# Patient Record
Sex: FEMALE | Race: WHITE | NOT HISPANIC OR LATINO | Employment: UNEMPLOYED | URBAN - METROPOLITAN AREA
[De-identification: names, ages, dates, MRNs, and addresses within clinical notes are randomized per-mention and may not be internally consistent; named-entity substitution may affect disease eponyms.]

---
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
## 2023-02-07 ENCOUNTER — OFFICE VISIT (OUTPATIENT)
Dept: FAMILY MEDICINE CLINIC | Facility: CLINIC | Age: 18
End: 2023-02-07

## 2023-02-07 VITALS
HEIGHT: 64 IN | BODY MASS INDEX: 24.48 KG/M2 | WEIGHT: 143.4 LBS | HEART RATE: 100 BPM | DIASTOLIC BLOOD PRESSURE: 72 MMHG | SYSTOLIC BLOOD PRESSURE: 118 MMHG | OXYGEN SATURATION: 98 %

## 2023-02-07 DIAGNOSIS — Z13.220 SCREENING FOR CHOLESTEROL LEVEL: ICD-10-CM

## 2023-02-07 DIAGNOSIS — F41.9 ANXIETY: ICD-10-CM

## 2023-02-07 DIAGNOSIS — F32.1 MODERATE MAJOR DEPRESSION (HCC): ICD-10-CM

## 2023-02-07 DIAGNOSIS — Z00.00 ENCOUNTER FOR SCREENING AND PREVENTATIVE CARE: Primary | ICD-10-CM

## 2023-02-07 DIAGNOSIS — Z13.1 SCREENING FOR DIABETES MELLITUS: ICD-10-CM

## 2023-02-07 DIAGNOSIS — Z13.29 SCREENING FOR THYROID DISORDER: ICD-10-CM

## 2023-02-07 DIAGNOSIS — F10.10 ALCOHOL ABUSE: ICD-10-CM

## 2023-02-07 DIAGNOSIS — Z13.0 SCREENING FOR DEFICIENCY ANEMIA: ICD-10-CM

## 2023-02-07 NOTE — PROGRESS NOTES
New Patient Outpatient Note    HPI:     Deepali Camacho, 21 y o  female  presents today as a new patient and to establish care  She is interested in discussing further monitoring and treatment of her anxiety and depression  She is staying home from her second semester of college due to significant increases in anxiety and depression  She first noticed it during the mid to late semester  Many other individuals chalked it up to being away from home/ home sickness  She then started to self medicate with alcohol  It was a vicious cycle of increased stress due to demands of classes and inability to keep up due to hang overs resulting in more drinking  By the end of the semester and returning home her parents saw that there was a real issue and started the process of holding her home this semester to obtain help  She denies any sexual or mental abuse  She has had passive thoughts of hurting herself but this only happened once, and it was fleeting without a plan  She is currently on lexapro 20mg daily  She has been off of the medication for two days, but prefers to stay on current medication instead of changing it  She also had buspar on medication list but she stopped the medication due to its ineffectiveness  They require paperwork filled out to help patient maintain position in school and scholarship  The patient and her parent are interested in the referrals for psychiatry and psychology          Family   UTD in chart      Past Medical History:   Diagnosis Date   • Annual physical exam 7/12/2022   • Anxiety         Past Surgical History:   Procedure Laterality Date   • WISDOM TOOTH EXTRACTION            Current Outpatient Medications:   •  escitalopram (LEXAPRO) 20 mg tablet, TAKE 1 TABLET BY MOUTH EVERY DAY, Disp: 90 tablet, Rfl: 2     SOCIAL:   Rent/Own: Own, parrents house  Currently living with: living with parents while not in school  Stable food: Yes  Safe At Home: Yes  Hobbies: friends, working out, training, dog  Profession/ employment: [employer], [address] [employer], athletic training  Restriction to medical procedures:  None    SEXUAL HISTORY:   Did not review with patient in room  I did ask if she had any trauma or rape while in school  She denied this occurring  Psychological History  Psychiatric history: anxiety, ADD  History of inpatient: None  Current Therapy/ Provider: none, primary care took care of it  Current Medications: Lexapro 20mg, off for two days    Substance History  Smoking: none  Alcohol Use: none  Substance use:  None      ROS:     Review of Systems   Constitutional: Positive for fatigue  Negative for chills, fever and unexpected weight change  Night sweats   HENT: Negative for congestion, ear discharge, ear pain, postnasal drip, rhinorrhea, sinus pressure, sinus pain and sore throat  Eyes: Negative for pain, discharge, itching and visual disturbance  Respiratory: Negative for cough, chest tightness and shortness of breath  Cardiovascular: Positive for palpitations (when anxious)  Negative for chest pain  Gastrointestinal: Positive for nausea (anxiety, when she cannot eat)  Negative for abdominal pain, constipation, diarrhea and vomiting  Endocrine: Positive for polyphagia (anxious)  Negative for polydipsia and polyuria  Genitourinary: Negative for difficulty urinating, dysuria and frequency  Skin: Negative for rash and wound  Neurological: Positive for dizziness (w/ palpitations) and headaches (intermittent )  Negative for light-headedness  Psychiatric/Behavioral: Positive for decreased concentration  Negative for self-injury and suicidal ideas  The patient is nervous/anxious  OBJECTIVE  Vitals:    02/07/23 1344   BP: 118/72   Pulse: 100   SpO2: 98%        Physical Exam  Constitutional:       General: She is in acute distress (mild anxiety)  Appearance: Normal appearance  She is not ill-appearing, toxic-appearing or diaphoretic     HENT: Head: Normocephalic and atraumatic  Right Ear: Tympanic membrane, ear canal and external ear normal  There is no impacted cerumen  Left Ear: Tympanic membrane, ear canal and external ear normal  There is no impacted cerumen  Nose: Nose normal  No congestion or rhinorrhea  Mouth/Throat:      Mouth: Mucous membranes are moist       Pharynx: Oropharynx is clear  No oropharyngeal exudate or posterior oropharyngeal erythema  Eyes:      General:         Right eye: No discharge  Left eye: No discharge  Extraocular Movements: Extraocular movements intact  Pupils: Pupils are equal, round, and reactive to light  Cardiovascular:      Rate and Rhythm: Normal rate and regular rhythm  Pulses: Normal pulses  Heart sounds: Normal heart sounds  No murmur heard  No friction rub  No gallop  Pulmonary:      Effort: Pulmonary effort is normal  No respiratory distress  Breath sounds: Normal breath sounds  No stridor  No wheezing, rhonchi or rales  Abdominal:      General: Bowel sounds are normal  There is no distension  Palpations: Abdomen is soft  Tenderness: There is no abdominal tenderness  Musculoskeletal:      Cervical back: No tenderness  Lymphadenopathy:      Cervical: No cervical adenopathy  Skin:     General: Skin is warm  Capillary Refill: Capillary refill takes less than 2 seconds  Neurological:      Mental Status: She is alert  Sensory: No sensory deficit ( light touch present in all 4 extremities)  Motor: No weakness (5/5 in all 5 extremities)  Deep Tendon Reflexes: Reflexes normal ( 2/4, intact, C5, C6, L4, S1)  ASSESSMENT AND PLAN   Kavitha was seen today for establish care      Diagnoses and all orders for this visit:    Encounter for screening and preventative care  Screening for deficiency anemia  Screening for thyroid disorder  Screening for diabetes mellitus  Screening for cholesterol level  Establishing care   Patient to be evaluated for anemia, thyroid dysfunction, liver/kidney dysfunction, electrolyte abnormalities, or anemia  Will also have baseline labs for any changes to medications and monitoring    -     Lipid panel; Future  -     Comprehensive metabolic panel; Future  -     CBC and differential; Future  -     TSH, 3rd generation with Free T4 reflex; Future    Anxiety  Moderate major depression ([facility] [address] )  Alcohol abuse  Patient presents with exacerbation of anxiety and likely underlying depression  Mother is not convinced that this is purely depression/ anxiety and would like further diagnostic clarity  Patient has history/ diagnosis of ADD according to chart documentation, this may make it hard for determining the proper diagnosis  She was negative for the majority of manic symptoms  The patient and parent are requesting that referrals for psychology and psychiatry is placed  I reviewed other options such as SSRIs including-zoloft, celexa, paxil, prozac, but patient declined changing medications  I offered referral to partial program, patient and parents also declined at this time  Will obtain labs to review organic causes such as diabetes, electrolyte changes, or thyroid dysfunction  Patient or parent to call and follow up with insurance for local options/ providers       -     Ambulatory Referral to Psychology; Future  -     Ambulatory Referral to Psychiatry; Future  -     Comprehensive metabolic panel; Future  -     TSH, 3rd generation with Free T4 reflex;  Future         Reno Blair DO  [facility]  2/7/2023 4:48 PM

## 2023-02-09 ENCOUNTER — TELEPHONE (OUTPATIENT)
Dept: PSYCHIATRY | Facility: CLINIC | Age: 18
End: 2023-02-09

## 2023-02-09 NOTE — TELEPHONE ENCOUNTER
Patient has been added to the [facility]ication Management wait list  Confirmed insurance, needs of service, and location preferences

## 2023-02-16 LAB
ALBUMIN SERPL-MCNC: 4.4 G/DL (ref 3.6–5.1)
ALBUMIN/GLOB SERPL: 1.5 (CALC) (ref 1–2.5)
ALP SERPL-CCNC: 87 U/L (ref 36–128)
ALT SERPL-CCNC: 160 U/L (ref 5–32)
AST SERPL-CCNC: 109 U/L (ref 12–32)
BASOPHILS # BLD AUTO: 92 CELLS/UL (ref 0–200)
BASOPHILS NFR BLD AUTO: 1.4 %
BILIRUB SERPL-MCNC: 0.6 MG/DL (ref 0.2–1.1)
BUN SERPL-MCNC: 16 MG/DL (ref 7–20)
BUN/CREAT SERPL: ABNORMAL (CALC) (ref 6–22)
CALCIUM SERPL-MCNC: 9.8 MG/DL (ref 8.9–10.4)
CHLORIDE SERPL-SCNC: 103 MMOL/L (ref 98–110)
CHOLEST SERPL-MCNC: 200 MG/DL
CHOLEST/HDLC SERPL: 2.5 (CALC)
CO2 SERPL-SCNC: 25 MMOL/L (ref 20–32)
CREAT SERPL-MCNC: 0.74 MG/DL (ref 0.5–0.96)
EOSINOPHIL # BLD AUTO: 112 CELLS/UL (ref 15–500)
EOSINOPHIL NFR BLD AUTO: 1.7 %
ERYTHROCYTE [DISTWIDTH] IN BLOOD BY AUTOMATED COUNT: 14.9 % (ref 11–15)
GFR/BSA.PRED SERPLBLD CYS-BASED-ARV: 119 ML/MIN/1.73M2
GLOBULIN SER CALC-MCNC: 3 G/DL (CALC) (ref 2–3.8)
GLUCOSE SERPL-MCNC: 82 MG/DL (ref 65–99)
HCT VFR BLD AUTO: 38.5 % (ref 35–45)
HDLC SERPL-MCNC: 79 MG/DL
HGB BLD-MCNC: 12.9 G/DL (ref 11.7–15.5)
LDLC SERPL CALC-MCNC: 103 MG/DL (CALC)
LYMPHOCYTES # BLD AUTO: 1973 CELLS/UL (ref 850–3900)
LYMPHOCYTES NFR BLD AUTO: 29.9 %
MCH RBC QN AUTO: 28.6 PG (ref 27–33)
MCHC RBC AUTO-ENTMCNC: 33.5 G/DL (ref 32–36)
MCV RBC AUTO: 85.4 FL (ref 80–100)
MONOCYTES # BLD AUTO: 686 CELLS/UL (ref 200–950)
MONOCYTES NFR BLD AUTO: 10.4 %
NEUTROPHILS # BLD AUTO: 3736 CELLS/UL (ref 1500–7800)
NEUTROPHILS NFR BLD AUTO: 56.6 %
NONHDLC SERPL-MCNC: 121 MG/DL (CALC)
PLATELET # BLD AUTO: 389 THOUSAND/UL (ref 140–400)
PMV BLD REES-ECKER: 10.1 FL (ref 7.5–12.5)
POTASSIUM SERPL-SCNC: 4.2 MMOL/L (ref 3.8–5.1)
PROT SERPL-MCNC: 7.4 G/DL (ref 6.3–8.2)
RBC # BLD AUTO: 4.51 MILLION/UL (ref 3.8–5.1)
SODIUM SERPL-SCNC: 138 MMOL/L (ref 135–146)
TRIGL SERPL-MCNC: 86 MG/DL
TSH SERPL-ACNC: 4.29 MIU/L
WBC # BLD AUTO: 6.6 THOUSAND/UL (ref 3.8–10.8)

## 2023-02-24 ENCOUNTER — TELEPHONE (OUTPATIENT)
Dept: FAMILY MEDICINE CLINIC | Facility: CLINIC | Age: 18
End: 2023-02-24

## 2023-02-27 ENCOUNTER — TELEPHONE (OUTPATIENT)
Dept: FAMILY MEDICINE CLINIC | Facility: CLINIC | Age: 18
End: 2023-02-27

## 2023-02-27 ENCOUNTER — TELEPHONE (OUTPATIENT)
Dept: PSYCHIATRY | Facility: CLINIC | Age: 18
End: 2023-02-27

## 2023-02-27 NOTE — TELEPHONE ENCOUNTER
Contacted patient off of [facility]ication [facility]  to verify needs of services in attempts to offer patient an appointment at [facility]   Unable to LVM for patient to contact intake dept  in regards to scheduling appointment due to vm not being set up

## 2023-02-27 NOTE — TELEPHONE ENCOUNTER
Attempted to call patient earlier today  Went straight to [email or web address]  Therefore will attempt later this week to contact       Sekou Laboy DO  [facility]  2/27/2023 4:08 PM

## 2023-02-27 NOTE — TELEPHONE ENCOUNTER
I attempted the patient but the voicemail is full and I was unable to contact the patient  I will attempt on Wednesday again to review information

## 2023-03-08 ENCOUNTER — TELEPHONE (OUTPATIENT)
Dept: FAMILY MEDICINE CLINIC | Facility: CLINIC | Age: 18
End: 2023-03-08

## 2023-03-08 NOTE — TELEPHONE ENCOUNTER
I have attempted to contact the patient again without success  It went straight to voicemail  I will have  send a letter stating that she should call and update her phone number or set up mailbox to receive results  If interested she can make an appointment to review if necessary       Raza Jang DO  [facility]  3/8/2023 8:29 AM

## 2023-03-09 ENCOUNTER — TELEPHONE (OUTPATIENT)
Dept: FAMILY MEDICINE CLINIC | Facility: CLINIC | Age: 18
End: 2023-03-09

## 2023-03-09 DIAGNOSIS — R74.01 ELEVATED ALT MEASUREMENT: ICD-10-CM

## 2023-03-09 DIAGNOSIS — R74.01 ELEVATED AST (SGOT): Primary | ICD-10-CM

## 2023-03-09 DIAGNOSIS — E78.9 ELEVATED SERUM CHOLESTEROL: ICD-10-CM

## 2023-03-09 NOTE — TELEPHONE ENCOUNTER
Kavitha's mom called regarding the lab results since they hadn't heard anything  I told her Kavitha's number was attempted several times and a letter would be coming in the mail  She asked for a call back at her number [phone]  If she misses the call you can leave a detailed voicemail  Her mom is listed as being able to receive health care information

## 2023-03-13 NOTE — TELEPHONE ENCOUNTER
Called and spoke with mother  The patient is doing better  They have been watching her closely  They were able to get into Dr Ebenezer Mckinney and follow up with psych  She is also in therapy  Overall she is eating healthy and attempting to get back on track  She has good support with mother and father at home

## 2023-09-26 ENCOUNTER — OFFICE VISIT (OUTPATIENT)
Dept: FAMILY MEDICINE CLINIC | Facility: CLINIC | Age: 18
End: 2023-09-26
Payer: COMMERCIAL

## 2023-09-26 VITALS
WEIGHT: 119 LBS | SYSTOLIC BLOOD PRESSURE: 102 MMHG | RESPIRATION RATE: 16 BRPM | HEART RATE: 85 BPM | HEIGHT: 65 IN | OXYGEN SATURATION: 100 % | BODY MASS INDEX: 19.83 KG/M2 | DIASTOLIC BLOOD PRESSURE: 62 MMHG

## 2023-09-26 DIAGNOSIS — Z30.019 ENCOUNTER FOR INITIAL PRESCRIPTION OF CONTRACEPTIVES, UNSPECIFIED CONTRACEPTIVE: Primary | ICD-10-CM

## 2023-09-26 DIAGNOSIS — Z01.419 WELL WOMAN EXAM: ICD-10-CM

## 2023-09-26 DIAGNOSIS — L70.0 ACNE VULGARIS: ICD-10-CM

## 2023-09-26 PROCEDURE — 99213 OFFICE O/P EST LOW 20 MIN: CPT | Performed by: FAMILY MEDICINE

## 2023-09-26 NOTE — PATIENT INSTRUCTIONS
Birth Control Pills   AMBULATORY CARE:   Birth control pills  are also called oral contraceptives, or the pill. It is medicine that helps prevent pregnancy by stopping ovulation. Ovulation is when the ovaries make and release an egg cell each month. If this egg gets fertilized by sperm, pregnancy occurs. You will need to take the pill at the same time every day. Your healthcare provider will tell you when to start taking the pill. You will also be told what to do if you miss a dose. Instructions will depend on the kind of birth control pills you are taking. Different kinds of birth control pills:  Some kinds are taken for 21 days in a row, followed by 7 days of placebo (no hormones) pills. Other kinds are taken for 24 days followed by 4 days of placebos. Each kind has a certain amount of female hormones. Your provider will decide on the kind that is best for you based on your age and other health conditions. Call your local emergency number (911 in the [address]) for any of the following: You have any of the following signs of a stroke:      Numbness or drooping on one side of your face     Weakness in an arm or leg    Confusion or difficulty speaking    Dizziness, a severe headache, or vision loss    You feel lightheaded, short of breath, and have chest pain. You cough up blood. Seek care immediately if:   Your arm or leg feels warm, tender, and painful. It may look swollen and red. You have severe pain, numbness, or swelling in your arms or legs. Contact your healthcare provider if:   You have forgotten to take a birth control pill. You have mood changes, such as depression, since starting birth control pills. You have nausea or are vomiting. You have severe abdominal pain. You missed a period and have questions or concerns about being pregnant. You still have bleeding 4 months after taking birth control pills correctly.     You have questions or concerns about your condition or care.    What may be done before you can start taking birth control pills: You need to see your healthcare provider to get a prescription. Any of the following may be done before your healthcare provider gives you a prescription:  Your healthcare provider will ask about diseases and illnesses you have had in the past. Your provider will check your risk for blood clots, heart conditions, or stroke. Tell your provider if you had gastric bypass surgery. This surgery can affect the way your body absorbs medicines such as birth control pills. Your provider will also check your blood pressure, and may do a breast and pelvic exam. A Pap smear may also be done during the pelvic exam. This is a test to make sure you do not have abnormal changes on your cervix. You may need other tests, such as a urine test to make sure you are not pregnant. Your provider will ask if you take any medicines and if you smoke. Smoking increases your risk for stroke, heart attack, or a blood clot in your lungs. If you smoke, you should not take certain kinds of birth control pills. Advantages of birth control pills:  When birth control pills are used correctly, the chances of getting pregnant are very low. Birth control pills may help decrease bleeding and pain during your monthly period. They may also help prevent cancer of the uterus and ovaries. Disadvantages of birth control pills: You may have sudden changes in your mood or feelings while you take birth control pills. You may have nausea and a decreased sex drive. You may have an increased appetite and rapid weight gain. You may also have bleeding in between periods, less frequent periods, vaginal dryness, and breast pain. Birth control pills will not protect you from sexually transmitted infections. Rarely, some birth control pills can increase your risk for a blood clot. This may become life-threatening. If you decide you want to get pregnant:   If you are planning to have a baby, ask your healthcare provider when you may stop taking your birth control pills. It may take some time for you to start ovulating again. Ask your healthcare provider for more information about pregnancy after birth control pills. When to start taking birth control pills after you have a baby: If you are not breastfeeding, you may start taking birth control pills 3 weeks after you give birth. You may be able to take certain types of birth control pills if you are breastfeeding. These pills can be started from 6 weeks to 6 months after you give birth. Ask your healthcare provider for more information about when to start taking birth control pills after you give birth. What you need to know about birth control pills and menopause:   Talk with your healthcare provider if you want to take birth control pills around menopause. Around age 39, you will enter into perimenopause. This means your hormone levels are dropping and you are ovulating less often. You can still become pregnant during this time. The risk for problems, such as miscarriage, are higher if you become pregnant after age 39. Birth control pills will prevent pregnancy, and may also help prevent or relieve some signs and symptoms of menopause. Examples are hot flashes and mood swings. Your provider will do tests when you are around age 48. The tests may show that you are in menopause. If the tests do not show menopause for sure, you may be able to continue taking the pill up to age 54. The decision will depend on your health and if you have any medical conditions, such as a blood clot. Do not smoke:  Nicotine and other chemicals in cigarettes and cigars increase your risk for a blood clot while you use birth control pills. The risk is higher if you are also 35 or older. Ask your healthcare provider for information if you currently smoke and need help to quit. E-cigarettes or smokeless tobacco still contain nicotine.  Talk to your healthcare provider before you use these products. Follow up with your healthcare provider as directed:  Write down your questions so you remember to ask them during your visits. © Copyright Antony Garg 2023 Information is for End User's use only and may not be sold, redistributed or otherwise used for commercial purposes. The above information is an  only. It is not intended as medical advice for individual conditions or treatments. Talk to your doctor, nurse or pharmacist before following any medical regimen to see if it is safe and effective for you. Intrauterine Device   WHAT YOU NEED TO KNOW:   What is an intrauterine device (IUD)? An IUD is a type of birth control that is inserted into your uterus. It is a small, flexible piece of plastic with a string on the end. It is inserted and removed by your healthcare provider. IUDs prevent sperm from reaching or fertilizing an egg. IUDs also prevent a fertilized egg from attaching to the uterus and developing into a fetus. What are the most common types of IUDs? Your healthcare provider will recommend the type of IUD that is right for you. This is based on your age and if you have had a child. If you have not had a child, a smaller IUD will be used. A copper IUD  slowly releases a small amount of copper into your uterus. This IUD can remain in place for up to 10 years. A hormone-releasing IUD  slowly releases a small amount of progesterone into your uterus. Progesterone is a hormone that is made by your body to help control your periods. This IUD can remain in place for 3 to 5 years. What are the advantages of an IUD? An IUD is 98% to 99% effective in preventing pregnancy. The IUD can be removed by your healthcare provider if you decide to have a baby. You may be able to get pregnant as soon as the IUD is removed. An IUD protects you from pregnancy right after it is inserted. You do not have to stop sexual activity to insert it.  You do not have to remember to take your birth control pill. Copper IUDs are safer for some women than oral birth control pills. Examples include women who smoke or have a history of blood clots. Hormone-releasing IUDs may decrease certain health problems. Examples include bleeding and cramping that happen with your monthly period. What are the disadvantages of an IUD? There is a small chance that you could get pregnant. Sometimes the IUD cannot be removed after you get pregnant. This increases your risk of a miscarriage or an ectopic pregnancy. Ectopic pregnancy is when the fertilized egg starts to grow somewhere other than your uterus. An IUD does not protect you from sexually transmitted infections. You may have cramps during the first weeks after you get the IUD. A copper IUD may cause your monthly period to be heavier or more painful. This is more common within the first 3 months after you get the IUD. You may need to have your IUD removed if your bleeding or pain becomes severe. You may have spotting between periods. There is a small risk of an infection within the first 20 days after the IUD is placed. Infection can lead to pelvic inflammatory disease. This can cause infertility. Your uterus may tear when the IUD is inserted. The IUD may slip part or all of the way out of your uterus. How is the IUD inserted? The IUD is usually inserted during your monthly period. This may help decrease the amount of discomfort you have during the procedure. It also helps make sure that you are not pregnant. You will be asked to lie down and place your feet in stirrups. Your healthcare provider will gently insert a speculum into your vagina. This is the same tool used during a Pap smear. The speculum allows your healthcare provider to see inside your vagina to your cervix. The cervix is the opening of your uterus.     Your healthcare provider will clean your cervix with an antiseptic solution to prevent infection. You may be given numbing medicine. A long plastic tube is gently passed through your cervix and into your uterus. This tube has the IUD inside of it. The IUD is pushed out of the tube and into your uterus. You may have cramps as the IUD is inserted. The tube is removed after the IUD is in place. How can I make sure my IUD is still in place? An IUD has a string made of plastic thread. One to 2 inches of this string hangs into your vagina. You cannot see this string, and it should not cause problems when you have sex. Check your IUD string every 3 days for the first 3 months after it is inserted. After that, check the string after each monthly period. Do the following to check the placement of your IUD:  Wash your hands with soap and warm water. Dry them with a clean towel. Bend your knees and squat low to the ground. Gently put your index finger inside your vagina. The cervix is at the top of the vagina and feels like the tip of your nose. Feel for the IUD string. Do not pull on the string. You should not be able to feel the firm plastic of the IUD itself. Wash your hands after you check your IUD string. Where can I find more information? [facility] [address] [place] [address]  Phone: [phone]  Web Address: https://[employer]. org    When should I seek immediate care? You have severe pain or bleeding during your period. You have a fever and severe abdominal pain. When should I call my doctor? You think you are pregnant. The IUD has come out. You have bleeding from your vagina after you have sex, and it is not your period. You have pain during sex. You cannot feel the IUD string, the string feels longer, or you feel the plastic of the IUD itself. You have vaginal discharge that is green, yellow, or has a foul odor. You have questions or concerns about your condition or care.     CARE AGREEMENT:   You have the right to help plan your care. Learn about your health condition and how it may be treated. Discuss treatment options with your healthcare providers to decide what care you want to receive. You always have the right to refuse treatment. The above information is an  only. It is not intended as medical advice for individual conditions or treatments. Talk to your doctor, nurse or pharmacist before following any medical regimen to see if it is safe and effective for you. © Copyright [facility] 2023 Information is for End User's use only and may not be sold, redistributed or otherwise used for commercial purposes. Progesterone (By injection)   Progesterone (proe-CHHAYA-ter-one)  Treats menstruation problems caused by a hormone imbalance. Brand Name(s):   There may be other brand names for this medicine. When This Medicine Should Not Be Used: This medicine is not right for everyone. You should not receive it if you had an allergic reaction to progesterone, sesame seeds, or sesame oil. Do not use it if you have a history of liver disease, cancer of the breast or genitals, or blood clots (including stroke or heart attack). How to Use This Medicine:   Injectable  Your doctor will prescribe your exact dose and tell you how often it should be given. This medicine is given as a shot into one of your muscles. This medicine is usually given every day for 6 to 8 days, depending on the reason you need it. Missed dose: You must use this medicine on a fixed schedule. Call your doctor or pharmacist if you miss a dose. Drugs and Foods to Avoid:      Ask your doctor or pharmacist before using any other medicine, including over-the-counter medicines, vitamins, and herbal products.       Warnings While Using This Medicine:   Tell your doctor if you are pregnant or breastfeeding, or if you have diabetes, heart disease, migraine headaches, epilepsy, asthma, kidney disease, or a history of depression. This medicine may cause blood clots, which may lead to serious problems such as stroke, embolism, or thrombosis. Tell any doctor or dentist who treats you that you are using this medicine. This medicine may affect certain medical test results. This medicine may make you sleepy, tired, or dizzy. Do not drive or do anything else that could be dangerous until you know how this medicine affects you. Possible Side Effects While Using This Medicine:   Call your doctor right away if you notice any of these side effects: Allergic reaction: Itching or hives, swelling in your face or hands, swelling or tingling in your mouth or throat, chest tightness, trouble breathing  Chest pain, trouble breathing, or coughing up blood  Depression  Numbness or weakness on one side of your body, sudden or severe headache, problems with vision, speech, or walking  Rapid weight gain, swelling in your hands, ankles, or feet  Unusual, heavy, or nonstop vaginal bleeding  If you notice these less serious side effects, talk with your doctor:   Breast pain or tenderness  Light breakthrough bleeding or spotting  Pain, swelling, or redness where the shot was given  If you notice other side effects that you think are caused by this medicine, tell your doctor. Call your doctor for medical advice about side effects. You may report side effects to FDA at 7-203-FDA-0715  © Copyright Miesha Sin 2023 Information is for End User's use only and may not be sold, redistributed or otherwise used for commercial purposes. The above information is an  only. It is not intended as medical advice for individual conditions or treatments. Talk to your doctor, nurse or pharmacist before following any medical regimen to see if it is safe and effective for you. Etonogestrel/Ethinyl Estradiol (Into the vagina)   Ethinyl Estradiol (ETH-i-nil es-tra-DYE-ol), Etonogestrel (e-toe-jessica-CHHAYA-trel)  Prevents pregnancy.    Brand Name(s): EluRyng, Haloette, NuvaRing   There may be other brand names for this medicine. When This Medicine Should Not Be Used: This medicine is not right for everyone. Do not use it if you had an allergic reaction to etonogestrel or ethinyl estradiol, if you are pregnant, or if you have vaginal bleeding that has not been checked by a doctor. Do not use it if you have liver disease or tumors, breast cancer, problems with blood clots, or certain heart problems. How to Use This Medicine: Insert  This medicine is in a ring that is put into your vagina. Your doctor or nurse will show you how to put in the ring. The ring should be left in place for 3 weeks. It will then be removed and another one will be inserted 1 week later. During the week without the ring, you will usually have your menstrual period. The first time you start using the ring, you should also use a second form of birth control during the first 7 days to avoid pregnancy. Do not use a diaphragm, because the ring may affect how the diaphragm fits. Read and follow the patient instructions that come with this medicine. Talk to your doctor or pharmacist if you have any questions. Once the ring is in place, you should not be able to feel it. If you feel uncomfortable, the ring may not be inserted far enough. Gently push the ring farther into your vagina. If you feel pain, talk to your doctor. Check for the presence of the ring inside your vagina regularly (including before and after having sex). The ring may move down accidently. This can happen if you are having a bowel movement. Gently push the ring back into place. If the ring comes all the way out, rinse it with warm water and put it back in. Call your doctor if the ring comes out several times. Missed dose:   If NuvaRing® has slipped out and it has been out for less than 3 hours, rinse it in cool or lukewarm water and reinsert it. You should still be protected from pregnancy.  If Malva Ripon has been out for more than 3 hours, insert a new ring. You must use an extra method of birth control until the Shelvy Go has been in place for 7 days in a row. Do not use a diaphragm. If you leave the vaginal ring in place for more than 4 weeks, you may not be protected from pregnancy. Make sure that you are not pregnant before you insert a new ring. You must use an additional form of birth control (not a diaphragm) until the new ring has been in place for 7 days in a row. If you forget to insert a new ring after the ring-free week, call your doctor for instructions. Store this medicine at room temperature, away from heat and direct light for up to 4 months. Place the used vaginal ring in the re-sealable foil pouch and throw it in the trash. Do not flush the ring down the toilet. Drugs and Foods to Avoid:   Ask your doctor or pharmacist before using any other medicine, including over-the-counter medicines, vitamins, and herbal products. Do not use this medicine together with medicine to treat hepatitis C virus infection, including ombitasvir/paritaprevir/ritonavir, with or without dasabuvir. Some foods and medicines can affect how etonogestrel/ethinyl estradiol works. Tell your doctor if you are using any of the following:  Acetaminophen, aprepitant, ascorbic acid, atorvastatin, bosentan, clofibric acid, cyclosporine, morphine, prednisolone, salicylic acid, Yuri's wort, temazepam, theophylline, tizanidine  Medicine for HIV/AIDS (including boceprevir, telaprevir)  Medicine to treat an infection (including fluconazole, griseofulvin, itraconazole, ketoconazole, miconazole, rifabutin, rifampicin, voriconazole)  Medicine to treat seizures (including carbamazepine, felbamate, lamotrigine, oxcarbazepine, phenobarbital, phenytoin, rufinamide, topiramate)  Thyroid medicine  Do not eat grapefruit or drink grapefruit juice while you are using this medicine. Ask your doctor before you use other products or medicines into your vagina.  You may need to remove the ring first.  Warnings While Using This Medicine: It is not safe to take this medicine during pregnancy. It could harm an unborn baby. Tell your doctor right away if you become pregnant. Tell your doctor if you are breastfeeding, or if you recently had a baby, miscarriage, or . Tell your doctor if you have kidney disease, cervical cancer, diabetes, epilepsy, migraines, heart or blood vessel disease, high cholesterol, high blood pressure, or a history of depression or chloasma (skin discoloration on the face). Tell your doctor if you smoke or if you are having a surgery that requires inactivity for a long time. This medicine may cause the following problems:  Increased risk of heart attack, stroke, or blood clots  Toxic shock syndrome  Liver problems  High blood pressure  Gallbladder disease  High cholesterol or fats in the blood  Increased risk of breast or cervical cancer  This medicine may cause skin discoloration. Use a sunscreen when you are outdoors. Avoid sunlamps and tanning beds. This medicine will not protect you from HIV/AIDS or other sexually transmitted diseases. You might have spotting or irregular bleeding when you first start using this medicine. You might have unplanned bleeding if you miss a dose or are late taking it. However, if you have heavy bleeding, call your doctor. Tell any doctor or dentist who treats you that you are using this medicine. You may need to stop using this medicine several days before you have surgery or medical tests. Tell any doctor or dentist who treats you that you are using this medicine. This medicine may affect certain medical test results. Your doctor will check your progress and the effects of this medicine at regular visits. Keep all appointments. Keep all medicine out of the reach of children. Never share your medicine with anyone.   Possible Side Effects While Using This Medicine:   Call your doctor right away if you notice any of these side effects: Allergic reaction: Itching or hives, swelling in your face or hands, swelling or tingling in your mouth or throat, chest tightness, trouble breathing  Blistering, peeling, red skin rash  Breast lumps, tenderness, pain, swelling, or discharge  Chest pain that may spread, trouble breathing, coughing up blood  Dark urine or pale stools, loss of appetite, yellow skin or eyes  Fast, slow, or pounding heartbeat  Numbness or weakness on one side of your body, pain in your lower leg, sudden or severe headache, problems with vision, speech, or walking  Redness, pain, itching, or burning sensation inside your vagina  Sudden and severe stomach pain, nausea, vomiting, lightheadedness  Sudden high fever, diarrhea, dizziness, fainting, muscle aches, sunburn-like rash  Unusual or unexpected vaginal bleeding or heavy bleeding  Vision loss, double vision  If you notice these less serious side effects, talk with your doctor:   Darkened skin on your face  Depression, mood changes  Headaches  If you notice other side effects that you think are caused by this medicine, tell your doctor. Call your doctor for medical advice about side effects. You may report side effects to FDA at 6-244-FDA-3229  © Copyright Eleanor Richter 2023 Information is for End User's use only and may not be sold, redistributed or otherwise used for commercial purposes. The above information is an  only. It is not intended as medical advice for individual conditions or treatments. Talk to your doctor, nurse or pharmacist before following any medical regimen to see if it is safe and effective for you. Etonogestrel (Nexplanon, Implanon) - (Implant)   Why this medicine is used:   Prevents pregnancy.   Contact a nurse or doctor right away if you have:  Chest pain, trouble breathing, coughing up blood  Dark urine or pale stools, yellow skin or eyes  Numbness or weakness, problems with vision, speech, or walking, leg pain  Pain in your lower leg (calf) or severe pain in your abdomen  Heavy vaginal bleeding  Nausea, vomiting, loss of appetite, stomach pain, headache     Common side effects:  Acne or pimples, mood changes, weight gain  Pain, tingling, bleeding, bruising, redness, itching, or swelling where the implant is placed  © Copyright [employer] 2023 Information is for End User's use only and may not be sold, redistributed or otherwise used for commercial purposes.

## 2023-09-26 NOTE — PROGRESS NOTES
Outpatient Note- Follow up     HPI:     Gini Matos , 19 y.o. female  presents today for birth control review. She does not have a specific option in mind. The majority of the visit was reviewing options for birth control. We discussed OCPs, progesterone only injection, NuvaRing, Nexplanon, hormonal IUD, and copper IUD. The patient is primarily looking to get on birth control to prevent unwanted pregnancy. She is interested in medication, but is hesitant to start any that may cause weight gain. Unfortunately we discussed most of the hormonal birth control can cause this. We also discussed mood swings which can also be seen with many options that can effect hormones. The patient after discussion is inteested in OBGYN referral to help with further discussion and decisions on the type of treatment she would be interested in attempted. I think the Copper IUD would be most effective to avoid weight gain and mood changes. She is also interested in obtaining referral to dermatology for acne. She has attempted multiple OTC medications without success. Past Medical History:   Diagnosis Date   • Annual physical exam 7/12/2022   • Anxiety       ROS:   Review of Systems   See HPI    OBJECTIVE  Vitals:    09/26/23 1043   BP: 102/62   Pulse: 85   Resp: 16   SpO2: 100%        Physical Exam   No PE performed. Focused on discussion of medication and proper management/side effects. ASSESSMENT AND PLAN   Diagnoses and all orders for this visit:    Encounter for initial prescription of contraceptives, unspecified contraceptive  Well woman exam  Patient interested in going to OB/GYN for follow-up  discussion and intervention for birth control. We did review the majority of the different options. believe she would best be treated with copper IUD to avoid hormonal and mood changes. She is already on medication for general anxiety. Referral placed. -     Ambulatory Referral to Gynecology;  Future    Acne vulgaris  Referral placed. Patient request dermatology consult secondary to her acne. She has tried multiple over-the-counter medications. -     Ambulatory Referral to Dermatology;  Future       Fatuma Hopkins DO  [facility]  9/26/2023 11:17 AM

## 2023-12-22 ENCOUNTER — OFFICE VISIT (OUTPATIENT)
Dept: URGENT CARE | Facility: CLINIC | Age: 18
End: 2023-12-22
Payer: COMMERCIAL

## 2023-12-22 VITALS
SYSTOLIC BLOOD PRESSURE: 102 MMHG | OXYGEN SATURATION: 99 % | HEART RATE: 82 BPM | RESPIRATION RATE: 16 BRPM | DIASTOLIC BLOOD PRESSURE: 63 MMHG | TEMPERATURE: 97.3 F

## 2023-12-22 DIAGNOSIS — T78.40XA ALLERGIC REACTION, INITIAL ENCOUNTER: Primary | ICD-10-CM

## 2023-12-22 PROCEDURE — G0382 LEV 3 HOSP TYPE B ED VISIT: HCPCS | Performed by: NURSE PRACTITIONER

## 2023-12-22 PROCEDURE — 96372 THER/PROPH/DIAG INJ SC/IM: CPT | Performed by: NURSE PRACTITIONER

## 2023-12-22 RX ORDER — PREDNISONE 20 MG/1
20 TABLET ORAL 2 TIMES DAILY WITH MEALS
Qty: 10 TABLET | Refills: 0 | Status: SHIPPED | OUTPATIENT
Start: 2023-12-22 | End: 2023-12-27

## 2023-12-22 RX ORDER — DEXAMETHASONE SODIUM PHOSPHATE 10 MG/ML
10 INJECTION, SOLUTION INTRAMUSCULAR; INTRAVENOUS ONCE
Status: COMPLETED | OUTPATIENT
Start: 2023-12-22 | End: 2023-12-22

## 2023-12-22 RX ORDER — MINOCYCLINE HYDROCHLORIDE 100 MG/1
100 TABLET ORAL 2 TIMES DAILY
COMMUNITY

## 2023-12-22 RX ADMIN — DEXAMETHASONE SODIUM PHOSPHATE 10 MG: 10 INJECTION, SOLUTION INTRAMUSCULAR; INTRAVENOUS at 11:47

## 2023-12-22 NOTE — PROGRESS NOTES
[facility]        NAME: Kavitha Larsen is a 18 y.o. female  : 2005    MRN: [id number]  DATE: 2023  TIME: 11:39 AM    Assessment and Plan   Allergic reaction, initial encounter [T78.40XA]  1. Allergic reaction, initial encounter  dexamethasone (PF) (DECADRON) injection 10 mg    predniSONE 20 mg tablet            Patient Instructions       Follow up with PCP in 3-5 days.  Proceed to  ER if symptoms worsen.    Chief Complaint     Chief Complaint   Patient presents with    Rash     Started with a rash last night, generalized. Unknown new foods or products.Started Minocycline 3 weeks ago.          History of Present Illness       HPI  Reports rash to her body, all over. Says she just started minocycline about 3 weeks ago. No new detergents or soaps. No new clothing. No new lotions. No known allergies to food.     Post administration of decadron, she experienced a pre-syncopal episode. She was some water and juice. Her mother was called, who came to the clinic and drove her home.     Review of Systems   Review of Systems   Constitutional:  Negative for fever.   HENT:  Negative for trouble swallowing.    Respiratory:  Negative for cough, chest tightness, shortness of breath and wheezing.    Gastrointestinal:  Negative for nausea.   Skin:  Positive for rash.   Neurological:  Negative for light-headedness.         Current Medications       Current Outpatient Medications:     minocycline (DYNACIN) 100 MG tablet, Take 100 mg by mouth 2 (two) times a day, Disp: , Rfl:     predniSONE 20 mg tablet, Take 1 tablet (20 mg total) by mouth 2 (two) times a day with meals for 5 days, Disp: 10 tablet, Rfl: 0    escitalopram (LEXAPRO) 20 mg tablet, TAKE 1 TABLET BY MOUTH EVERY DAY (Patient not taking: Reported on 2023), Disp: 90 tablet, Rfl: 2    sertraline (ZOLOFT) 50 mg tablet, Take 50 mg by mouth daily (Patient not taking: Reported on 2023), Disp: , Rfl:     Current Facility-Administered  Medications:     dexamethasone (PF) (DECADRON) injection 10 mg, 10 mg, Intramuscular, Once, PRANAV Fernandez    Current Allergies     Allergies as of 12/22/2023    (No Known Allergies)            The following portions of the patient's history were reviewed and updated as appropriate: allergies, current medications, past family history, past medical history, past social history, past surgical history and problem list.     Past Medical History:   Diagnosis Date    Annual physical exam 7/12/2022    Anxiety        Past Surgical History:   Procedure Laterality Date    WISDOM TOOTH EXTRACTION         Family History   Problem Relation Age of Onset    No Known Problems Mother     Anxiety disorder Father     No Known Problems Sister     No Known Problems Sister     Breast cancer Paternal Grandmother     Diabetes type II Paternal Grandfather          Medications have been verified.        Objective   /63   Pulse 82   Temp (!) 97.3 °F (36.3 °C) (Temporal)   Resp 16   SpO2 99%   No LMP recorded.       Physical Exam     Physical Exam  HENT:      Mouth/Throat:      Pharynx: No oropharyngeal exudate or posterior oropharyngeal erythema.   Cardiovascular:      Heart sounds: Normal heart sounds.   Pulmonary:      Breath sounds: Normal breath sounds.   Musculoskeletal:         General: No swelling or tenderness.   Skin:     Findings: Rash (full body generalized hives, worse on the B/L lower extremities and her trunk. No crusting or discharge) present.

## 2023-12-27 ENCOUNTER — TELEPHONE (OUTPATIENT)
Age: 18
End: 2023-12-27

## 2023-12-27 NOTE — TELEPHONE ENCOUNTER
Patient is requesting an insurance referral for the following specialty:       Test Name / Order Name: [id number] consult and treat    DX Code: L70.0 L81.0    Date Of Service: BACKDATE - December 5th    Location/Facility Name/Address/Phone #: [facility]     Location / Facility NPI: [id number]    Fax [phone]

## 2023-12-28 NOTE — TELEPHONE ENCOUNTER
Nereida from [facility] office called requesting the insurance referral to be faxed to the office. Fax # [phone], Phone number [phone]. Please advise Thank you!

## 2024-03-18 ENCOUNTER — OFFICE VISIT (OUTPATIENT)
Dept: OBGYN CLINIC | Facility: CLINIC | Age: 19
End: 2024-03-18
Payer: COMMERCIAL

## 2024-03-18 VITALS
HEIGHT: 65 IN | WEIGHT: 114 LBS | DIASTOLIC BLOOD PRESSURE: 64 MMHG | SYSTOLIC BLOOD PRESSURE: 106 MMHG | BODY MASS INDEX: 18.99 KG/M2

## 2024-03-18 DIAGNOSIS — Z30.41 ENCOUNTER FOR SURVEILLANCE OF CONTRACEPTIVE PILLS: Primary | ICD-10-CM

## 2024-03-18 PROCEDURE — 99203 OFFICE O/P NEW LOW 30 MIN: CPT | Performed by: OBSTETRICS & GYNECOLOGY

## 2024-03-18 RX ORDER — NORETHINDRONE ACETATE AND ETHINYL ESTRADIOL, AND FERROUS FUMARATE 1MG-20(24)
1 KIT ORAL DAILY
Qty: 84 TABLET | Refills: 1 | Status: SHIPPED | OUTPATIENT
Start: 2024-03-18 | End: 2024-06-10

## 2024-03-18 NOTE — PROGRESS NOTES
Assessment/Plan:  Patient has expressed desire for contraception.  We have discussed all methods of contraception including OCPs, Nuva Ring, Ortho Evra as combined contraceptives.  I have discussed that these methods include estrogens which can increase the risk of DVT/PE/Stroke, though this risk is much lower than the risk of this morbidity with pregnancy. I have discussed the small side effects that some patients experience including breast tenderness, bloating, mood changes, headaches.  The benefits of these contraceptives include shorter, lighter menstruation, less dysmenorrhea, acne reduction, potential decreased risk of ovarian cancer and ability to manipulate menses.  In addition, we have discussed progestin-only contraceptives including progestin only pills, depo provera, implant, LNG-IUD. The patient understands that she may experience irregular bleeding with each of these methods. Specifically with systemic progestins, she may experience an increase in appetite and potential weight gain, as well as  slower return of fertility after discontinuation. With the long-acting reversible options, there is the additional risk of placement, migration and difficulty with removal.  We have also discussed the paraguard IUD, which is a nonhormonal option. The risks of this include risks of placement and migration.  Finally, we have discussed abstinence, rhythm method, condoms, spermicides, diaphragms, etc. The patient understands that none of the methods discussed are 100% effective except for abstinence. We discussed each method's failure rates and perfect vs ideal use. We have also discussed emergency contraception and how to obtain Plan B, Nataliya or a paraguard IUD should unprotected sex occur.      Would like to start OCPs  Reviewed risks, benefits and alternatives.  Reviewed when to start pills and what to do if she misses a pill.  She will return in 3 months for a BC f/u    Discussed Gardisil vaccine and pap  "screening at age 19    Encouraged condom use.    Encounter for surveillance of contraceptive pills  -     norethindrone-ethinyl estradiol-ferrous fumarate (Molly 24 Fe) 1-20 MG-MCG(24) per tablet; Take 1 tablet by mouth daily      I have spent a total time of 30 minutes on 03/18/24 in caring for this patient including Prognosis, Risks and benefits of tx options, Instructions for management, Patient and family education, Importance of tx compliance, Counseling / Coordination of care, and Documenting in the medical record.    Subjective:      Patient ID: Kavitha Larsen is a 18 y.o. female.    HPI  18y G0 sexually active female presents to discuss starting contraception.  She uses condoms.  She has a reg monthly period.  She believes she has had the HPV vaccine.  She declines STI screening.  She is most interested in a pill.    The following portions of the patient's history were reviewed and updated as appropriate: allergies, current medications, past family history, past medical history, past social history, past surgical history, and problem list.    Review of Systems   Constitutional: Negative.    HENT: Negative.     Respiratory: Negative.     Cardiovascular: Negative.    Gastrointestinal: Negative.    Genitourinary: Negative.    Neurological: Negative.    Psychiatric/Behavioral: Negative.           Objective:      /64 (BP Location: Left arm, Patient Position: Sitting, Cuff Size: Standard)   Ht 5' 5\" (1.651 m)   Wt 51.7 kg (114 lb)   LMP 03/07/2024 (Exact Date)   BMI 18.97 kg/m²          Physical Exam  Vitals and nursing note reviewed.   Constitutional:       Appearance: Normal appearance. She is normal weight.   HENT:      Head: Normocephalic and atraumatic.   Eyes:      Extraocular Movements: Extraocular movements intact.      Conjunctiva/sclera: Conjunctivae normal.      Pupils: Pupils are equal, round, and reactive to light.   Pulmonary:      Effort: Pulmonary effort is normal.   Skin:     General: Skin " is warm and dry.   Neurological:      General: No focal deficit present.      Mental Status: She is alert and oriented to person, place, and time.   Psychiatric:         Mood and Affect: Mood normal.         Behavior: Behavior normal.

## 2024-09-02 DIAGNOSIS — Z30.41 ENCOUNTER FOR SURVEILLANCE OF CONTRACEPTIVE PILLS: ICD-10-CM

## 2024-09-02 RX ORDER — NORETHINDRONE ACETATE AND ETHINYL ESTRADIOL 1MG-20(24)
1 KIT ORAL DAILY
Qty: 84 TABLET | Refills: 1 | Status: SHIPPED | OUTPATIENT
Start: 2024-09-02

## 2025-06-17 DIAGNOSIS — Z30.41 ENCOUNTER FOR SURVEILLANCE OF CONTRACEPTIVE PILLS: ICD-10-CM

## 2025-06-18 ENCOUNTER — TELEPHONE (OUTPATIENT)
Dept: OBGYN CLINIC | Facility: CLINIC | Age: 20
End: 2025-06-18

## 2025-06-19 RX ORDER — NORETHINDRONE ACETATE AND ETHINYL ESTRADIOL 1MG-20(24)
1 KIT ORAL DAILY
Qty: 84 TABLET | Refills: 1 | OUTPATIENT
Start: 2025-06-19

## 2025-06-23 ENCOUNTER — TELEPHONE (OUTPATIENT)
Age: 20
End: 2025-06-23

## 2025-06-23 DIAGNOSIS — Z30.41 ENCOUNTER FOR SURVEILLANCE OF CONTRACEPTIVE PILLS: ICD-10-CM

## 2025-06-23 RX ORDER — NORETHINDRONE ACETATE AND ETHINYL ESTRADIOL 1MG-20(24)
1 KIT ORAL DAILY
Qty: 84 TABLET | Refills: 1 | Status: SHIPPED | OUTPATIENT
Start: 2025-06-23

## 2025-06-23 NOTE — TELEPHONE ENCOUNTER
Patient made appt 08/27/25 patient states her ocps need to be refilled before Sunday   Patient made the first available appt in the [place] office